# Patient Record
(demographics unavailable — no encounter records)

---

## 2025-01-02 NOTE — ASSESSMENT
[FreeTextEntry1] : Mr. AYDE FARRELL, 78 year old male, former smoker (smoked for 1yr only), w/ hx of GERD, HLD, BPH, depression and PNA who referred by Dr. Susan Cook (PCP) for enlarged RML nodule.   CT chest on 04/29/2024: (MSR) - Airspace consolidation previously described in the right middle lobe has significantly improved with minimal residual scarring present. - Again seen is a solid noncalcified pulmonary nodule in the right middle lobe anteriorly along the minor fissure measuring 11 mm, not significant changed in size from 3/13/2024 however previously measuring 7 mm on 12/11/2019. - There are subcentimeter calcified granulomas bilaterally. - There is persistent bilateral nonspecific biapical pleural-parenchymal thickening/scarring. Mild patchy pulmonary opacities bilaterally not significant changed. - Several pulmonary nodules are noted; as few selected examples: Additional small pulmonary nodules are present for example measuring 4 mm in the left lower lobe on image 259, series 4, stable. - Additional 4 mm solid noncalcified pulmonary nodule left upper lobe centrally on image 62, series 4 is stable. -  Bronchial wall thickening bilaterally may reflect reactive airways disease versus bronchiolitis.  I have reviewed the patient's medical records and diagnostic images at time of this office consultation and have made the following recommendation: 1.      I, GEORGE Ortez, personally performed the evaluation and management (E/M) services for this established patient who follow up today with an existing condition.  That E/M includes conducting the examination, assessing all new/exacerbated/existing conditions, and establishing a plan of care.  Today, my ACP, ROSEANNE Jaeger, was here to observe my evaluation and management services for this existing condition to be followed going forward.

## 2025-01-02 NOTE — HISTORY OF PRESENT ILLNESS
[FreeTextEntry1] : Mr. AYDE FARRELL, 78 year old male, former smoker (smoked for 1yr only), w/ hx of GERD, HLD, BPH, depression and PNA who referred by Dr. Susan Cook (PCP) for enlarged RML nodule.   CT chest on 04/29/2024: (MSR) - Airspace consolidation previously described in the right middle lobe has significantly improved with minimal residual scarring present. - Again seen is a solid noncalcified pulmonary nodule in the right middle lobe anteriorly along the minor fissure measuring 11 mm, not significant changed in size from 3/13/2024 however previously measuring 7 mm on 12/11/2019. - There are subcentimeter calcified granulomas bilaterally. - There is persistent bilateral nonspecific biapical pleural-parenchymal thickening/scarring. Mild patchy pulmonary opacities bilaterally not significant changed. - Several pulmonary nodules are noted; as few selected examples: Additional small pulmonary nodules are present for example measuring 4 mm in the left lower lobe on image 259, series 4, stable. - Additional 4 mm solid noncalcified pulmonary nodule left upper lobe centrally on image 62, series 4 is stable. -  Bronchial wall thickening bilaterally may reflect reactive airways disease versus bronchiolitis.  PET/CT on 06/10/2024: (MSR) - There are coronary artery calcifications , findings of old granulomatous disease in the chest (several calcified nodes in the mediastinum and right hilar region, and multiple bilateral calcified pulmonary granulomas ), and a small pericardial effusion, all similar to prior. - On 4/29/24 CT note was made of an 11 mm noncalcified solid nodule, right middle lobe, stable from 3/13/24 tho 7 mm on 12/11/19. Using the current measuring technique, this nodule measured 13 mm on 4/29/24, and is stable on 6/10/24 CT image #66, without activity on PET, tho it did measure 7 mm on 12/11/19. This is likely benign. - Elsewhere in both lungs are multiple areas of pulmonary scarring stable from 12/11/19, some with subcentimeter nodular appearances. - There are multiple foci of activity in the hilar regions and mediastinum without enlarged nodes, SUVmax= 6.8 in the right hilar region, a likely benign pattern related to inflammation from granulomatous disease.  On 06/17/2024, PET/CT reviewed and explained to patient and his family members, RML without activity, likely benign vs old granuloma disease. Will order a Nodifty test and RTC after via TTM to discuss findings.   Nodify test on 06/20/2024: pre test: 21% risk of malignancy. Post test: 11% risk of malignancy.   On 07/01/2024, Case was discussed with Dr. Tanner over the phone. Findings are discussed with patient's daughter who is his health care proxy. RML nodule likely benign although it enlarged from previous scan, recommended continue f/u with CT chest w/o contrast. However, patient and his family are very anxious and concerned of this lung nodule, and understanding this lung nodule are most likely benign. They would like the lung nodule resected. Will schedule for Right VATS, RML wedge resection. Patient called back later to hold off surgery.   CT chest on  -   Patient is followed today via Telephonic visit.

## 2025-01-02 NOTE — CONSULT LETTER
[Dear  ___] : Dear  [unfilled], [Consult Letter:] : I had the pleasure of evaluating your patient, [unfilled]. [Please see my note below.] : Please see my note below. [Consult Closing:] : Thank you very much for allowing me to participate in the care of this patient.  If you have any questions, please do not hesitate to contact me. [Sincerely,] : Sincerely, [FreeTextEntry2] : Dr. Susan Cook (PCP/Ref) [FreeTextEntry3] : Cash Young MD  Attending Surgeon  Division of Thoracic Surgery  , Cardiovascular and Thoracic Surgery  [facility]  Destruction After The Procedure: No

## 2025-01-13 NOTE — REASON FOR VISIT
[Source: ______] : History obtained from [unfilled] [[facility] Cent[facility]Entry[facility]] : [id number] [TWNoteComboBox1] : Hebrew

## 2025-01-13 NOTE — ASSESSMENT
[FreeTextEntry1] : Mr. AYDE FARRELL, 78 year old male, former smoker (smoked for 1yr only), w/ hx of GERD, HLD, BPH, depression and PNA who referred by Dr. Susan Cook (PCP) for enlarged RML nodule.   CT chest on 04/29/2024: (MSR) - Airspace consolidation previously described in the right middle lobe has significantly improved with minimal residual scarring present. - Again seen is a solid noncalcified pulmonary nodule in the right middle lobe anteriorly along the minor fissure measuring 11 mm, not significant changed in size from 3/13/2024 however previously measuring 7 mm on 12/11/2019. - There are subcentimeter calcified granulomas bilaterally. - There is persistent bilateral nonspecific biapical pleural-parenchymal thickening/scarring. Mild patchy pulmonary opacities bilaterally not significant changed. - Several pulmonary nodules are noted; as few selected examples: Additional small pulmonary nodules are present for example measuring 4 mm in the left lower lobe on image 259, series 4, stable. - Additional 4 mm solid noncalcified pulmonary nodule left upper lobe centrally on image 62, series 4 is stable. -  Bronchial wall thickening bilaterally may reflect reactive airways disease versus bronchiolitis.  I have reviewed the patient's medical records and diagnostic images at time of this office consultation and have made the following recommendation: 1.  CT chest reviewed and explained to patient and his son in law via Azeri , stable RML. Interval increased LLL nodule but small, I recommended patient to return to office in 6 months with CT Chest without contrast.  2. Refer patient to Dr. Stephany Vasques for increased SOB.    I, GEORGE Ortez, personally performed the evaluation and management (E/M) services for this established patient who follow up today with an existing condition.  That E/M includes conducting the examination, assessing all new/exacerbated/existing conditions, and establishing a plan of care.  Today, my ACP, ROSEANNE Jaeger, was here to observe my evaluation and management services for this existing condition to be followed going forward.

## 2025-01-13 NOTE — ASSESSMENT
[FreeTextEntry1] : Mr. AYDE FARRELL, 78 year old male, former smoker (smoked for 1yr only), w/ hx of GERD, HLD, BPH, depression and PNA who referred by Dr. Susan Cook (PCP) for enlarged RML nodule.   CT chest on 04/29/2024: (MSR) - Airspace consolidation previously described in the right middle lobe has significantly improved with minimal residual scarring present. - Again seen is a solid noncalcified pulmonary nodule in the right middle lobe anteriorly along the minor fissure measuring 11 mm, not significant changed in size from 3/13/2024 however previously measuring 7 mm on 12/11/2019. - There are subcentimeter calcified granulomas bilaterally. - There is persistent bilateral nonspecific biapical pleural-parenchymal thickening/scarring. Mild patchy pulmonary opacities bilaterally not significant changed. - Several pulmonary nodules are noted; as few selected examples: Additional small pulmonary nodules are present for example measuring 4 mm in the left lower lobe on image 259, series 4, stable. - Additional 4 mm solid noncalcified pulmonary nodule left upper lobe centrally on image 62, series 4 is stable. -  Bronchial wall thickening bilaterally may reflect reactive airways disease versus bronchiolitis.  I have reviewed the patient's medical records and diagnostic images at time of this office consultation and have made the following recommendation: 1.  CT chest reviewed and explained to patient and his son in law via Malay , stable RML. Interval increased LLL nodule but small, I recommended patient to return to office in 6 months with CT Chest without contrast.  2. Refer patient to Dr. Stephany Vasques for increased SOB.    I, GEORGE Ortez, personally performed the evaluation and management (E/M) services for this established patient who follow up today with an existing condition.  That E/M includes conducting the examination, assessing all new/exacerbated/existing conditions, and establishing a plan of care.  Today, my ACP, ROSEANNE Jaeger, was here to observe my evaluation and management services for this existing condition to be followed going forward.

## 2025-01-13 NOTE — REVIEW OF SYSTEMS
Please review and approve per pt request    [As Noted in HPI] : as noted in HPI [Negative] : Heme/Lymph

## 2025-01-13 NOTE — REASON FOR VISIT
[Source: ______] : History obtained from [unfilled] [[facility] CentertEntry3] : [id number] [TWNoteComboBox1] : Vietnamese

## 2025-01-13 NOTE — CONSULT LETTER
[FreeTextEntry2] : Dr. Susan Cook (PCP/Ref) [FreeTextEntry3] : Cash Young MD  Attending Surgeon  Division of Thoracic Surgery  , Cardiovascular and Thoracic Surgery  [facility] at [facility]

## 2025-01-13 NOTE — HISTORY OF PRESENT ILLNESS
[FreeTextEntry1] : Mr. AYDE FARRELL, 78 year old male, former smoker (smoked for 1yr only), w/ hx of GERD, HLD, BPH, depression and PNA who referred by Dr. Susan Cook (PCP) for enlarged RML nodule.   CT chest on 04/29/2024: (MSR) - Airspace consolidation previously described in the right middle lobe has significantly improved with minimal residual scarring present. - Again seen is a solid noncalcified pulmonary nodule in the right middle lobe anteriorly along the minor fissure measuring 11 mm, not significant changed in size from 3/13/2024 however previously measuring 7 mm on 12/11/2019. - There are subcentimeter calcified granulomas bilaterally. - There is persistent bilateral nonspecific biapical pleural-parenchymal thickening/scarring. Mild patchy pulmonary opacities bilaterally not significant changed. - Several pulmonary nodules are noted; as few selected examples: Additional small pulmonary nodules are present for example measuring 4 mm in the left lower lobe on image 259, series 4, stable. - Additional 4 mm solid noncalcified pulmonary nodule left upper lobe centrally on image 62, series 4 is stable. -  Bronchial wall thickening bilaterally may reflect reactive airways disease versus bronchiolitis.  PET/CT on 06/10/2024: (MSR) - There are coronary artery calcifications , findings of old granulomatous disease in the chest (several calcified nodes in the mediastinum and right hilar region, and multiple bilateral calcified pulmonary granulomas ), and a small pericardial effusion, all similar to prior. - On 4/29/24 CT note was made of an 11 mm noncalcified solid nodule, right middle lobe, stable from 3/13/24 tho 7 mm on 12/11/19. Using the current measuring technique, this nodule measured 13 mm on 4/29/24, and is stable on 6/10/24 CT image #66, without activity on PET, tho it did measure 7 mm on 12/11/19. This is likely benign. - Elsewhere in both lungs are multiple areas of pulmonary scarring stable from 12/11/19, some with subcentimeter nodular appearances. - There are multiple foci of activity in the hilar regions and mediastinum without enlarged nodes, SUVmax= 6.8 in the right hilar region, a likely benign pattern related to inflammation from granulomatous disease.  On 06/17/2024, PET/CT reviewed and explained to patient and his family members, RML without activity, likely benign vs old granuloma disease. Will order a Nodifty test and RTC after via TTM to discuss findings.   Nodify test on 06/20/2024: pre test: 21% risk of malignancy. Post test: 11% risk of malignancy.   On 07/01/2024, Case was discussed with Dr. Tanner over the phone. Findings are discussed with patient's daughter who is his health care proxy. RML nodule likely benign although it enlarged from previous scan, recommended continue f/u with CT chest w/o contrast. However, patient and his family are very anxious and concerned of this lung nodule, and understanding this lung nodule are most likely benign. They would like the lung nodule resected. Will schedule for Right VATS, RML wedge resection. Patient called back later to hold off surgery.   CT chest on 01/03/2025:  - There is a 10 x 11 mm nodule in the right middle lobe abutting the fissure, not significantly changed. This likely benign, however, given its interval increase in size when compared to 2019 continued CT follow-up recommended in one year - There are mildly increased interstitial densities predominantly right lower lobe, unchanged.  - There is a stable 4 mm nodule left lower lobe series 4 image 264. - There is a 6 mm nodule left lower lobe, previously 4 mm series 4 image 218. Due to the interval change, follow up chest CT recommended in 3 months  Patient is here today for a follow up. Patient c/o SOB on exertion and progressive gets worse, c/o cough, denies chest pain, fever, chills.

## 2025-01-13 NOTE — CONSULT LETTER
[FreeTextEntry2] : Dr. Susan Cook (PCP/Ref) [FreeTextEntry3] : Cash Young MD  Attending Surgeon  Division of Thoracic Surgery  , Cardiovascular and Thoracic Surgery  [facility] at [facility]

## 2025-01-13 NOTE — ASSESSMENT
[FreeTextEntry1] : Mr. AYDE FARRELL, 78 year old male, former smoker (smoked for 1yr only), w/ hx of GERD, HLD, BPH, depression and PNA who referred by Dr. Susan Cook (PCP) for enlarged RML nodule.   CT chest on 04/29/2024: (MSR) - Airspace consolidation previously described in the right middle lobe has significantly improved with minimal residual scarring present. - Again seen is a solid noncalcified pulmonary nodule in the right middle lobe anteriorly along the minor fissure measuring 11 mm, not significant changed in size from 3/13/2024 however previously measuring 7 mm on 12/11/2019. - There are subcentimeter calcified granulomas bilaterally. - There is persistent bilateral nonspecific biapical pleural-parenchymal thickening/scarring. Mild patchy pulmonary opacities bilaterally not significant changed. - Several pulmonary nodules are noted; as few selected examples: Additional small pulmonary nodules are present for example measuring 4 mm in the left lower lobe on image 259, series 4, stable. - Additional 4 mm solid noncalcified pulmonary nodule left upper lobe centrally on image 62, series 4 is stable. -  Bronchial wall thickening bilaterally may reflect reactive airways disease versus bronchiolitis.  I have reviewed the patient's medical records and diagnostic images at time of this office consultation and have made the following recommendation: 1.  CT chest reviewed and explained to patient and his son in law via Kinyarwanda , stable RML. Interval increased LLL nodule but small, I recommended patient to return to office in 6 months with CT Chest without contrast.  2. Refer patient to Dr. Stephany Vasques for increased SOB.    I, GEORGE Ortez, personally performed the evaluation and management (E/M) services for this established patient who follow up today with an existing condition.  That E/M includes conducting the examination, assessing all new/exacerbated/existing conditions, and establishing a plan of care.  Today, my ACP, ROSEANNE Jaeger, was here to observe my evaluation and management services for this existing condition to be followed going forward.

## 2025-01-13 NOTE — PHYSICAL EXAM
"Subjective   Patient ID: Patricia Velásquez is a 54 y.o. female who presents for Annual Exam (YEARLY PHYSICAL).    HPI   Pt is doing well  UTD vaccines, mammogram and colonoscopy  Review of Systems   All other systems reviewed and are negative.      Objective   /66   Pulse 68   Ht 1.676 m (5' 6\")   Wt 108 kg (239 lb)   SpO2 96%   BMI 38.58 kg/m²     Physical Exam  Constitutional:       Appearance: Normal appearance.   HENT:      Head: Normocephalic and atraumatic.      Right Ear: Tympanic membrane, ear canal and external ear normal.      Left Ear: Tympanic membrane, ear canal and external ear normal.      Nose: Nose normal.      Mouth/Throat:      Mouth: Mucous membranes are moist.      Pharynx: Oropharynx is clear.   Eyes:      Extraocular Movements: Extraocular movements intact.      Conjunctiva/sclera: Conjunctivae normal.      Pupils: Pupils are equal, round, and reactive to light.   Cardiovascular:      Rate and Rhythm: Normal rate and regular rhythm.      Pulses: Normal pulses.      Heart sounds: Normal heart sounds.   Pulmonary:      Effort: Pulmonary effort is normal.      Breath sounds: Normal breath sounds.   Abdominal:      General: Abdomen is flat. Bowel sounds are normal.      Palpations: Abdomen is soft.   Musculoskeletal:         General: Normal range of motion.      Cervical back: Normal range of motion and neck supple.   Skin:     General: Skin is warm and dry.      Capillary Refill: Capillary refill takes less than 2 seconds.   Neurological:      General: No focal deficit present.      Mental Status: She is alert and oriented to person, place, and time.   Psychiatric:         Mood and Affect: Mood normal.         Behavior: Behavior normal.         Thought Content: Thought content normal.         Assessment/Plan   Diagnoses and all orders for this visit:  Wellness examination         " [] : no respiratory distress [Auscultation Breath Sounds / Voice Sounds] : lungs were clear to auscultation bilaterally [Heart Rate And Rhythm] : heart rate was normal and rhythm regular [Heart Sounds] : normal S1 and S2 [Heart Sounds Gallop] : no gallops [Murmurs] : no murmurs [Heart Sounds Pericardial Friction Rub] : no pericardial rub [Examination Of The Chest] : the chest was normal in appearance [Chest Visual Inspection Thoracic Asymmetry] : no chest asymmetry [Diminished Respiratory Excursion] : normal chest expansion

## 2025-01-13 NOTE — CONSULT LETTER
[FreeTextEntry2] : Dr. Susan Cook (PCP/Ref) [FreeTextEntry3] : Cash Young MD  Attending Surgeon  Division of Thoracic Surgery  , Cardiovascular and Thoracic Surgery  [facility] at [facility]

## 2025-01-13 NOTE — REASON FOR VISIT
[Source: ______] : History obtained from [unfilled] [[facility] Hospital[facility]Entry3] : [id number] [TWNoteComboBox1] : Kinyarwanda

## 2025-04-02 NOTE — REASON FOR VISIT
[Initial] : an initial visit [Abnormal CXR/ Chest CT] : an abnormal CXR/ chest CT [Shortness of Breath] : shortness of breath [Pulmonary Nodules] : pulmonary nodules

## 2025-04-03 NOTE — ASSESSMENT
[FreeTextEntry1] : 79-year-old male former <1 pack year smoker, pulmonary nodule hyperlipidemia, GERD, BPH, depression, history of tuberculosis, with pulmonary nodules referred for management of pulmonary issues. CT with bronchial wall thickening and airways of bronchiolitis in additional to pulmonary nodules. Will start airway clearance. Nebulizer ordered. Check sputum culture. Follow up repeat CT chest as ordered in July. UTD with flu and pneumococcal vaccinations. Follow up in 3 months, sooner if needed.

## 2025-04-03 NOTE — HISTORY OF PRESENT ILLNESS
[Former] : former [TextBox_4] : 79-year-old male former <1 pack year smoker, pulmonary nodule hyperlipidemia, GERD, BPH, depression, history of tuberculosis, with pulmonary nodules referred for management of pulmonary issues.  Patient states he has a lot of sputum production and shortness of breath. Hemoptysis 1 - 2 teaspoons about once per month. No fevers, night sweats.  Has had weight loss.     [TextBox_11] : 0.25 [TextBox_13] : 3 [YearQuit] : 50 years prior

## 2025-04-03 NOTE — REVIEW OF SYSTEMS
[Fever] : no fever [Chills] : no chills [Cough] : no cough [Sputum] : no sputum [SOB on Exertion] : sob on exertion

## 2025-07-14 NOTE — ASSESSMENT
[FreeTextEntry1] : Mr. AYDE FARRELL, 79 year old male, former smoker (smoked for 1yr only), w/ hx of GERD, HLD, BPH, depression and PNA who referred by Dr. Susan Cook (PCP) for enlarged RML nodule.   I have reviewed the patient's medical records and diagnostic images at time of this office consultation and have made the following recommendation: 1.  CT chest reviewed and explained to patient and his son in law, stable lung nodules, I recommended patient to return to office in 6 months with CT Chest without contrast.  I, GEORGE Ortez, personally performed the evaluation and management (E/M) services for this established patient who follow up today with an existing condition.  That E/M includes conducting the examination, assessing all new/exacerbated/existing conditions, and establishing a plan of care.  Today, my ACP, Keyona Hensley ANP-C, was here to observe my evaluation and management services for this existing condition to be followed going forward.

## 2025-07-14 NOTE — HISTORY OF PRESENT ILLNESS
[FreeTextEntry1] : Mr. AYDE FARRELL, 79 year old male, former smoker (smoked for 1yr only), w/ hx of GERD, HLD, BPH, depression and PNA who referred by Dr. Susan Cook (PCP) for enlarged RML nodule.   CT chest on 04/29/2024: (MSR) - Airspace consolidation previously described in the right middle lobe has significantly improved with minimal residual scarring present. - Again seen is a solid noncalcified pulmonary nodule in the right middle lobe anteriorly along the minor fissure measuring 11 mm, not significant changed in size from 3/13/2024 however previously measuring 7 mm on 12/11/2019. - There are subcentimeter calcified granulomas bilaterally. - There is persistent bilateral nonspecific biapical pleural-parenchymal thickening/scarring. Mild patchy pulmonary opacities bilaterally not significant changed. - Several pulmonary nodules are noted; as few selected examples: Additional small pulmonary nodules are present for example measuring 4 mm in the left lower lobe on image 259, series 4, stable. - Additional 4 mm solid noncalcified pulmonary nodule left upper lobe centrally on image 62, series 4 is stable. -  Bronchial wall thickening bilaterally may reflect reactive airways disease versus bronchiolitis.  PET/CT on 06/10/2024: (MSR) - There are coronary artery calcifications , findings of old granulomatous disease in the chest (several calcified nodes in the mediastinum and right hilar region, and multiple bilateral calcified pulmonary granulomas ), and a small pericardial effusion, all similar to prior. - On 4/29/24 CT note was made of an 11 mm noncalcified solid nodule, right middle lobe, stable from 3/13/24 tho 7 mm on 12/11/19. Using the current measuring technique, this nodule measured 13 mm on 4/29/24, and is stable on 6/10/24 CT image #66, without activity on PET, tho it did measure 7 mm on 12/11/19. This is likely benign. - Elsewhere in both lungs are multiple areas of pulmonary scarring stable from 12/11/19, some with subcentimeter nodular appearances. - There are multiple foci of activity in the hilar regions and mediastinum without enlarged nodes, SUVmax= 6.8 in the right hilar region, a likely benign pattern related to inflammation from granulomatous disease.  On 06/17/2024, PET/CT reviewed and explained to patient and his family members, RML without activity, likely benign vs old granuloma disease. Will order a Nodifty test and RTC after via TTM to discuss findings.   Nodify test on 06/20/2024: pre test: 21% risk of malignancy. Post test: 11% risk of malignancy.   On 07/01/2024, Case was discussed with Dr. Tanner over the phone. Findings are discussed with patient's daughter who is his health care proxy. RML nodule likely benign although it enlarged from previous scan, recommended continue f/u with CT chest w/o contrast. However, patient and his family are very anxious and concerned of this lung nodule, and understanding this lung nodule are most likely benign. They would like the lung nodule resected. Will schedule for Right VATS, RML wedge resection. Patient called back later to hold off surgery.   CT chest on 01/03/2025:  - There is a 10 x 11 mm nodule in the right middle lobe abutting the fissure, not significantly changed. This likely benign, however, given its interval increase in size when compared to 2019 continued CT follow-up recommended in one year - There are mildly increased interstitial densities predominantly right lower lobe, unchanged.  - There is a stable 4 mm nodule left lower lobe series 4 image 264. - There is a 6 mm nodule left lower lobe, previously 4 mm series 4 image 218. Due to the interval change, follow up chest CT recommended in 3 months  On 01/13/2025, CT chest reviewed and explained to patient and his son in law via [place] , stable RML. Interval increased LLL nodule but small, I recommended patient to return to office in 6 months with CT Chest without contrast.   CT chest on 05/30/2025: (MSR) - Stable bilateral pulmonary nodules largest within the right middle lobe abutting the minor fissure measuring 11 mm (series 4, image 171). Stable smaller nodules for example measuring 6 mm within the left lower lobe (series 4, image 215) and 4 mm within the left lower lobe (series 4, image 260). Scattered calcified granulomas. - Mild/moderate multifocal parenchymal scarring. - Sequela of prior granulomatous disease. - Moderate coronary artery calcifications. Stable small pericardial effusion.  Patient is here today for a 6 months follow up. Patient c/o SOB on exertion, denies cough, chest pain, fever, chills.

## 2025-07-14 NOTE — HISTORY OF PRESENT ILLNESS
[FreeTextEntry1] : Mr. AYDE FARRELL, 79 year old male, former smoker (smoked for 1yr only), w/ hx of GERD, HLD, BPH, depression and PNA who referred by Dr. Susan Cook (PCP) for enlarged RML nodule.   CT chest on 04/29/2024: (MSR) - Airspace consolidation previously described in the right middle lobe has significantly improved with minimal residual scarring present. - Again seen is a solid noncalcified pulmonary nodule in the right middle lobe anteriorly along the minor fissure measuring 11 mm, not significant changed in size from 3/13/2024 however previously measuring 7 mm on 12/11/2019. - There are subcentimeter calcified granulomas bilaterally. - There is persistent bilateral nonspecific biapical pleural-parenchymal thickening/scarring. Mild patchy pulmonary opacities bilaterally not significant changed. - Several pulmonary nodules are noted; as few selected examples: Additional small pulmonary nodules are present for example measuring 4 mm in the left lower lobe on image 259, series 4, stable. - Additional 4 mm solid noncalcified pulmonary nodule left upper lobe centrally on image 62, series 4 is stable. -  Bronchial wall thickening bilaterally may reflect reactive airways disease versus bronchiolitis.  PET/CT on 06/10/2024: (MSR) - There are coronary artery calcifications , findings of old granulomatous disease in the chest (several calcified nodes in the mediastinum and right hilar region, and multiple bilateral calcified pulmonary granulomas ), and a small pericardial effusion, all similar to prior. - On 4/29/24 CT note was made of an 11 mm noncalcified solid nodule, right middle lobe, stable from 3/13/24 tho 7 mm on 12/11/19. Using the current measuring technique, this nodule measured 13 mm on 4/29/24, and is stable on 6/10/24 CT image #66, without activity on PET, tho it did measure 7 mm on 12/11/19. This is likely benign. - Elsewhere in both lungs are multiple areas of pulmonary scarring stable from 12/11/19, some with subcentimeter nodular appearances. - There are multiple foci of activity in the hilar regions and mediastinum without enlarged nodes, SUVmax= 6.8 in the right hilar region, a likely benign pattern related to inflammation from granulomatous disease.  On 06/17/2024, PET/CT reviewed and explained to patient and his family members, RML without activity, likely benign vs old granuloma disease. Will order a Nodifty test and RTC after via TTM to discuss findings.   Nodify test on 06/20/2024: pre test: 21% risk of malignancy. Post test: 11% risk of malignancy.   On 07/01/2024, Case was discussed with Dr. Tanner over the phone. Findings are discussed with patient's daughter who is his health care proxy. RML nodule likely benign although it enlarged from previous scan, recommended continue f/u with CT chest w/o contrast. However, patient and his family are very anxious and concerned of this lung nodule, and understanding this lung nodule are most likely benign. They would like the lung nodule resected. Will schedule for Right VATS, RML wedge resection. Patient called back later to hold off surgery.   CT chest on 01/03/2025:  - There is a 10 x 11 mm nodule in the right middle lobe abutting the fissure, not significantly changed. This likely benign, however, given its interval increase in size when compared to 2019 continued CT follow-up recommended in one year - There are mildly increased interstitial densities predominantly right lower lobe, unchanged.  - There is a stable 4 mm nodule left lower lobe series 4 image 264. - There is a 6 mm nodule left lower lobe, previously 4 mm series 4 image 218. Due to the interval change, follow up chest CT recommended in 3 months  On 01/13/2025, CT chest reviewed and explained to patient and his son in law via Yi , stable RML. Interval increased LLL nodule but small, I recommended patient to return to office in 6 months with CT Chest without contrast.   CT chest on 05/30/2025: (MSR) - Stable bilateral pulmonary nodules largest within the right middle lobe abutting the minor fissure measuring 11 mm (series 4, image 171). Stable smaller nodules for example measuring 6 mm within the left lower lobe (series 4, image 215) and 4 mm within the left lower lobe (series 4, image 260). Scattered calcified granulomas. - Mild/moderate multifocal parenchymal scarring. - Sequela of prior granulomatous disease. - Moderate coronary artery calcifications. Stable small pericardial effusion.  Patient is here today for a 6 months follow up. Patient c/o SOB on exertion, denies cough, chest pain, fever, chills.

## 2025-07-14 NOTE — CONSULT LETTER
[Dear  ___] : Dear  [unfilled], [Consult Letter:] : I had the pleasure of evaluating your patient, [unfilled]. [Please see my note below.] : Please see my note below. [Consult Closing:] : Thank you very much for allowing me to participate in the care of this patient.  If you have any questions, please do not hesitate to contact me. [Sincerely,] : Sincerely, [FreeTextEntry2] : Dr. Susan Cook (PCP/Ref) [FreeTextEntry3] : Cash Young MD  Attending Surgeon  Division of Thoracic Surgery  , Cardiovascular and Thoracic Surgery  [facility] [employer] [facility]

## 2025-07-14 NOTE — CONSULT LETTER
[Dear  ___] : Dear  [unfilled], [Consult Letter:] : I had the pleasure of evaluating your patient, [unfilled]. [Please see my note below.] : Please see my note below. [Consult Closing:] : Thank you very much for allowing me to participate in the care of this patient.  If you have any questions, please do not hesitate to contact me. [Sincerely,] : Sincerely, [FreeTextEntry2] : Dr. Susan Cook (PCP/Ref) [FreeTextEntry3] : Cash Young MD  Attending Surgeon  Division of Thoracic Surgery  , Cardiovascular and Thoracic Surgery  [facility] [employer] at [facility]